# Patient Record
Sex: FEMALE | Race: WHITE | NOT HISPANIC OR LATINO | URBAN - METROPOLITAN AREA
[De-identification: names, ages, dates, MRNs, and addresses within clinical notes are randomized per-mention and may not be internally consistent; named-entity substitution may affect disease eponyms.]

---

## 2020-01-11 ENCOUNTER — INPATIENT (INPATIENT)
Facility: HOSPITAL | Age: 60
LOS: 1 days | Discharge: HOME | End: 2020-01-13
Attending: SURGERY | Admitting: SURGERY
Payer: COMMERCIAL

## 2020-01-11 VITALS
TEMPERATURE: 97 F | DIASTOLIC BLOOD PRESSURE: 82 MMHG | RESPIRATION RATE: 18 BRPM | HEIGHT: 63 IN | SYSTOLIC BLOOD PRESSURE: 153 MMHG | WEIGHT: 199.96 LBS | OXYGEN SATURATION: 100 % | HEART RATE: 88 BPM

## 2020-01-11 DIAGNOSIS — Z90.49 ACQUIRED ABSENCE OF OTHER SPECIFIED PARTS OF DIGESTIVE TRACT: Chronic | ICD-10-CM

## 2020-01-11 DIAGNOSIS — E78.5 HYPERLIPIDEMIA, UNSPECIFIED: ICD-10-CM

## 2020-01-11 DIAGNOSIS — K25.9 GASTRIC ULCER, UNSPECIFIED AS ACUTE OR CHRONIC, WITHOUT HEMORRHAGE OR PERFORATION: ICD-10-CM

## 2020-01-11 DIAGNOSIS — E03.9 HYPOTHYROIDISM, UNSPECIFIED: ICD-10-CM

## 2020-01-11 DIAGNOSIS — K81.9 CHOLECYSTITIS, UNSPECIFIED: ICD-10-CM

## 2020-01-11 LAB
ALBUMIN SERPL ELPH-MCNC: 4.7 G/DL — SIGNIFICANT CHANGE UP (ref 3.5–5.2)
ALP SERPL-CCNC: 67 U/L — SIGNIFICANT CHANGE UP (ref 30–115)
ALT FLD-CCNC: 63 U/L — HIGH (ref 0–41)
ANION GAP SERPL CALC-SCNC: 10 MMOL/L — SIGNIFICANT CHANGE UP (ref 7–14)
ANION GAP SERPL CALC-SCNC: 13 MMOL/L — SIGNIFICANT CHANGE UP (ref 7–14)
APPEARANCE UR: CLEAR — SIGNIFICANT CHANGE UP
APTT BLD: 31.7 SEC — SIGNIFICANT CHANGE UP (ref 27–39.2)
AST SERPL-CCNC: 133 U/L — HIGH (ref 0–41)
BASE EXCESS BLDV CALC-SCNC: -3.6 MMOL/L — LOW (ref -2–2)
BASOPHILS # BLD AUTO: 0.09 K/UL — SIGNIFICANT CHANGE UP (ref 0–0.2)
BASOPHILS NFR BLD AUTO: 0.7 % — SIGNIFICANT CHANGE UP (ref 0–1)
BILIRUB SERPL-MCNC: 0.7 MG/DL — SIGNIFICANT CHANGE UP (ref 0.2–1.2)
BILIRUB UR-MCNC: NEGATIVE — SIGNIFICANT CHANGE UP
BLD GP AB SCN SERPL QL: SIGNIFICANT CHANGE UP
BUN SERPL-MCNC: 22 MG/DL — HIGH (ref 10–20)
BUN SERPL-MCNC: 31 MG/DL — HIGH (ref 10–20)
CA-I SERPL-SCNC: 1.2 MMOL/L — SIGNIFICANT CHANGE UP (ref 1.12–1.3)
CALCIUM SERPL-MCNC: 8.5 MG/DL — SIGNIFICANT CHANGE UP (ref 8.5–10.1)
CALCIUM SERPL-MCNC: 9.5 MG/DL — SIGNIFICANT CHANGE UP (ref 8.5–10.1)
CHLORIDE SERPL-SCNC: 101 MMOL/L — SIGNIFICANT CHANGE UP (ref 98–110)
CHLORIDE SERPL-SCNC: 108 MMOL/L — SIGNIFICANT CHANGE UP (ref 98–110)
CO2 SERPL-SCNC: 22 MMOL/L — SIGNIFICANT CHANGE UP (ref 17–32)
CO2 SERPL-SCNC: 22 MMOL/L — SIGNIFICANT CHANGE UP (ref 17–32)
COLOR SPEC: YELLOW — SIGNIFICANT CHANGE UP
CREAT SERPL-MCNC: 0.7 MG/DL — SIGNIFICANT CHANGE UP (ref 0.7–1.5)
CREAT SERPL-MCNC: 0.8 MG/DL — SIGNIFICANT CHANGE UP (ref 0.7–1.5)
DIFF PNL FLD: NEGATIVE — SIGNIFICANT CHANGE UP
EOSINOPHIL # BLD AUTO: 0.16 K/UL — SIGNIFICANT CHANGE UP (ref 0–0.7)
EOSINOPHIL NFR BLD AUTO: 1.2 % — SIGNIFICANT CHANGE UP (ref 0–8)
GAS PNL BLDV: 140 MMOL/L — SIGNIFICANT CHANGE UP (ref 136–145)
GAS PNL BLDV: SIGNIFICANT CHANGE UP
GLUCOSE SERPL-MCNC: 102 MG/DL — HIGH (ref 70–99)
GLUCOSE SERPL-MCNC: 130 MG/DL — HIGH (ref 70–99)
GLUCOSE UR QL: NEGATIVE MG/DL — SIGNIFICANT CHANGE UP
HCO3 BLDV-SCNC: 23 MMOL/L — SIGNIFICANT CHANGE UP (ref 22–29)
HCT VFR BLD CALC: 41.2 % — SIGNIFICANT CHANGE UP (ref 37–47)
HCT VFR BLD CALC: 44.5 % — SIGNIFICANT CHANGE UP (ref 37–47)
HCT VFR BLDA CALC: 43.5 % — SIGNIFICANT CHANGE UP (ref 34–44)
HGB BLD CALC-MCNC: 14.2 G/DL — SIGNIFICANT CHANGE UP (ref 14–18)
HGB BLD-MCNC: 12.8 G/DL — SIGNIFICANT CHANGE UP (ref 12–16)
HGB BLD-MCNC: 13.8 G/DL — SIGNIFICANT CHANGE UP (ref 12–16)
HOROWITZ INDEX BLDV+IHG-RTO: 21 — SIGNIFICANT CHANGE UP
IMM GRANULOCYTES NFR BLD AUTO: 0.4 % — HIGH (ref 0.1–0.3)
INR BLD: 0.97 RATIO — SIGNIFICANT CHANGE UP (ref 0.65–1.3)
KETONES UR-MCNC: NEGATIVE — SIGNIFICANT CHANGE UP
LACTATE BLDV-MCNC: 1.1 MMOL/L — SIGNIFICANT CHANGE UP (ref 0.5–1.6)
LEUKOCYTE ESTERASE UR-ACNC: NEGATIVE — SIGNIFICANT CHANGE UP
LIDOCAIN IGE QN: 38 U/L — SIGNIFICANT CHANGE UP (ref 7–60)
LYMPHOCYTES # BLD AUTO: 49.6 % — SIGNIFICANT CHANGE UP (ref 20.5–51.1)
LYMPHOCYTES # BLD AUTO: 6.75 K/UL — HIGH (ref 1.2–3.4)
MCHC RBC-ENTMCNC: 27 PG — SIGNIFICANT CHANGE UP (ref 27–31)
MCHC RBC-ENTMCNC: 27.5 PG — SIGNIFICANT CHANGE UP (ref 27–31)
MCHC RBC-ENTMCNC: 31 G/DL — LOW (ref 32–37)
MCHC RBC-ENTMCNC: 31.1 G/DL — LOW (ref 32–37)
MCV RBC AUTO: 87.1 FL — SIGNIFICANT CHANGE UP (ref 81–99)
MCV RBC AUTO: 88.4 FL — SIGNIFICANT CHANGE UP (ref 81–99)
MONOCYTES # BLD AUTO: 0.97 K/UL — HIGH (ref 0.1–0.6)
MONOCYTES NFR BLD AUTO: 7.1 % — SIGNIFICANT CHANGE UP (ref 1.7–9.3)
NEUTROPHILS # BLD AUTO: 5.59 K/UL — SIGNIFICANT CHANGE UP (ref 1.4–6.5)
NEUTROPHILS NFR BLD AUTO: 41 % — LOW (ref 42.2–75.2)
NITRITE UR-MCNC: NEGATIVE — SIGNIFICANT CHANGE UP
NRBC # BLD: 0 /100 WBCS — SIGNIFICANT CHANGE UP (ref 0–0)
NRBC # BLD: 0 /100 WBCS — SIGNIFICANT CHANGE UP (ref 0–0)
PCO2 BLDV: 46 MMHG — SIGNIFICANT CHANGE UP (ref 41–51)
PH BLDV: 7.31 — SIGNIFICANT CHANGE UP (ref 7.26–7.43)
PH UR: 6.5 — SIGNIFICANT CHANGE UP (ref 5–8)
PLATELET # BLD AUTO: 272 K/UL — SIGNIFICANT CHANGE UP (ref 130–400)
PLATELET # BLD AUTO: 303 K/UL — SIGNIFICANT CHANGE UP (ref 130–400)
PO2 BLDV: 44 MMHG — HIGH (ref 20–40)
POTASSIUM BLDV-SCNC: 3.9 MMOL/L — SIGNIFICANT CHANGE UP (ref 3.3–5.6)
POTASSIUM SERPL-MCNC: 4.7 MMOL/L — SIGNIFICANT CHANGE UP (ref 3.5–5)
POTASSIUM SERPL-MCNC: 6.2 MMOL/L — CRITICAL HIGH (ref 3.5–5)
POTASSIUM SERPL-SCNC: 4.7 MMOL/L — SIGNIFICANT CHANGE UP (ref 3.5–5)
POTASSIUM SERPL-SCNC: 6.2 MMOL/L — CRITICAL HIGH (ref 3.5–5)
PROT SERPL-MCNC: 7.9 G/DL — SIGNIFICANT CHANGE UP (ref 6–8)
PROT UR-MCNC: NEGATIVE MG/DL — SIGNIFICANT CHANGE UP
PROTHROM AB SERPL-ACNC: 11.2 SEC — SIGNIFICANT CHANGE UP (ref 9.95–12.87)
RBC # BLD: 4.66 M/UL — SIGNIFICANT CHANGE UP (ref 4.2–5.4)
RBC # BLD: 5.11 M/UL — SIGNIFICANT CHANGE UP (ref 4.2–5.4)
RBC # FLD: 13.1 % — SIGNIFICANT CHANGE UP (ref 11.5–14.5)
RBC # FLD: 13.2 % — SIGNIFICANT CHANGE UP (ref 11.5–14.5)
SAO2 % BLDV: 74 % — SIGNIFICANT CHANGE UP
SODIUM SERPL-SCNC: 136 MMOL/L — SIGNIFICANT CHANGE UP (ref 135–146)
SODIUM SERPL-SCNC: 140 MMOL/L — SIGNIFICANT CHANGE UP (ref 135–146)
SP GR SPEC: 1.01 — SIGNIFICANT CHANGE UP (ref 1.01–1.03)
UROBILINOGEN FLD QL: 0.2 MG/DL — SIGNIFICANT CHANGE UP (ref 0.2–0.2)
WBC # BLD: 13.62 K/UL — HIGH (ref 4.8–10.8)
WBC # BLD: 6.63 K/UL — SIGNIFICANT CHANGE UP (ref 4.8–10.8)
WBC # FLD AUTO: 13.62 K/UL — HIGH (ref 4.8–10.8)
WBC # FLD AUTO: 6.63 K/UL — SIGNIFICANT CHANGE UP (ref 4.8–10.8)

## 2020-01-11 PROCEDURE — 99221 1ST HOSP IP/OBS SF/LOW 40: CPT | Mod: AI

## 2020-01-11 PROCEDURE — 74177 CT ABD & PELVIS W/CONTRAST: CPT | Mod: 26

## 2020-01-11 PROCEDURE — 71045 X-RAY EXAM CHEST 1 VIEW: CPT | Mod: 26

## 2020-01-11 PROCEDURE — 74181 MRI ABDOMEN W/O CONTRAST: CPT | Mod: 26

## 2020-01-11 PROCEDURE — 99285 EMERGENCY DEPT VISIT HI MDM: CPT

## 2020-01-11 PROCEDURE — 76705 ECHO EXAM OF ABDOMEN: CPT | Mod: 26

## 2020-01-11 RX ORDER — IBUPROFEN 200 MG
600 TABLET ORAL EVERY 6 HOURS
Refills: 0 | Status: DISCONTINUED | OUTPATIENT
Start: 2020-01-11 | End: 2020-01-13

## 2020-01-11 RX ORDER — SIMVASTATIN 20 MG/1
40 TABLET, FILM COATED ORAL AT BEDTIME
Refills: 0 | Status: DISCONTINUED | OUTPATIENT
Start: 2020-01-11 | End: 2020-01-13

## 2020-01-11 RX ORDER — LEVOTHYROXINE SODIUM 125 MCG
75 TABLET ORAL DAILY
Refills: 0 | Status: DISCONTINUED | OUTPATIENT
Start: 2020-01-11 | End: 2020-01-11

## 2020-01-11 RX ORDER — HEPARIN SODIUM 5000 [USP'U]/ML
5000 INJECTION INTRAVENOUS; SUBCUTANEOUS EVERY 8 HOURS
Refills: 0 | Status: DISCONTINUED | OUTPATIENT
Start: 2020-01-11 | End: 2020-01-11

## 2020-01-11 RX ORDER — LEVOTHYROXINE SODIUM 125 MCG
75 TABLET ORAL ONCE
Refills: 0 | Status: DISCONTINUED | OUTPATIENT
Start: 2020-01-11 | End: 2020-01-13

## 2020-01-11 RX ORDER — CHLORHEXIDINE GLUCONATE 213 G/1000ML
1 SOLUTION TOPICAL
Refills: 0 | Status: DISCONTINUED | OUTPATIENT
Start: 2020-01-11 | End: 2020-01-13

## 2020-01-11 RX ORDER — ONDANSETRON 8 MG/1
4 TABLET, FILM COATED ORAL ONCE
Refills: 0 | Status: COMPLETED | OUTPATIENT
Start: 2020-01-11 | End: 2020-01-11

## 2020-01-11 RX ORDER — HEPARIN SODIUM 5000 [USP'U]/ML
5000 INJECTION INTRAVENOUS; SUBCUTANEOUS EVERY 8 HOURS
Refills: 0 | Status: DISCONTINUED | OUTPATIENT
Start: 2020-01-11 | End: 2020-01-13

## 2020-01-11 RX ORDER — LEVOTHYROXINE SODIUM 125 MCG
1 TABLET ORAL
Qty: 0 | Refills: 0 | DISCHARGE
Start: 2020-01-11

## 2020-01-11 RX ORDER — LEVOTHYROXINE SODIUM 125 MCG
88 TABLET ORAL DAILY
Refills: 0 | Status: DISCONTINUED | OUTPATIENT
Start: 2020-01-11 | End: 2020-01-13

## 2020-01-11 RX ORDER — MORPHINE SULFATE 50 MG/1
4 CAPSULE, EXTENDED RELEASE ORAL ONCE
Refills: 0 | Status: DISCONTINUED | OUTPATIENT
Start: 2020-01-11 | End: 2020-01-11

## 2020-01-11 RX ORDER — ACETAMINOPHEN 500 MG
650 TABLET ORAL EVERY 4 HOURS
Refills: 0 | Status: DISCONTINUED | OUTPATIENT
Start: 2020-01-11 | End: 2020-01-13

## 2020-01-11 RX ORDER — SODIUM CHLORIDE 9 MG/ML
1000 INJECTION, SOLUTION INTRAVENOUS
Refills: 0 | Status: DISCONTINUED | OUTPATIENT
Start: 2020-01-11 | End: 2020-01-13

## 2020-01-11 RX ORDER — AMPICILLIN SODIUM AND SULBACTAM SODIUM 250; 125 MG/ML; MG/ML
1.5 INJECTION, POWDER, FOR SUSPENSION INTRAMUSCULAR; INTRAVENOUS EVERY 6 HOURS
Refills: 0 | Status: DISCONTINUED | OUTPATIENT
Start: 2020-01-11 | End: 2020-01-11

## 2020-01-11 RX ORDER — DIPHENHYDRAMINE HYDROCHLORIDE AND LIDOCAINE HYDROCHLORIDE AND ALUMINUM HYDROXIDE AND MAGNESIUM HYDRO
30 KIT ONCE
Refills: 0 | Status: COMPLETED | OUTPATIENT
Start: 2020-01-11 | End: 2020-01-11

## 2020-01-11 RX ORDER — SIMVASTATIN 20 MG/1
1 TABLET, FILM COATED ORAL
Qty: 0 | Refills: 0 | DISCHARGE

## 2020-01-11 RX ORDER — PANTOPRAZOLE SODIUM 20 MG/1
40 TABLET, DELAYED RELEASE ORAL
Refills: 0 | Status: DISCONTINUED | OUTPATIENT
Start: 2020-01-11 | End: 2020-01-13

## 2020-01-11 RX ORDER — OXYCODONE AND ACETAMINOPHEN 5; 325 MG/1; MG/1
2 TABLET ORAL EVERY 6 HOURS
Refills: 0 | Status: DISCONTINUED | OUTPATIENT
Start: 2020-01-11 | End: 2020-01-13

## 2020-01-11 RX ORDER — FAMOTIDINE 10 MG/ML
20 INJECTION INTRAVENOUS ONCE
Refills: 0 | Status: COMPLETED | OUTPATIENT
Start: 2020-01-11 | End: 2020-01-11

## 2020-01-11 RX ORDER — AMPICILLIN SODIUM AND SULBACTAM SODIUM 250; 125 MG/ML; MG/ML
1.5 INJECTION, POWDER, FOR SUSPENSION INTRAMUSCULAR; INTRAVENOUS EVERY 6 HOURS
Refills: 0 | Status: DISCONTINUED | OUTPATIENT
Start: 2020-01-11 | End: 2020-01-13

## 2020-01-11 RX ORDER — AMPICILLIN SODIUM AND SULBACTAM SODIUM 250; 125 MG/ML; MG/ML
3 INJECTION, POWDER, FOR SUSPENSION INTRAMUSCULAR; INTRAVENOUS ONCE
Refills: 0 | Status: COMPLETED | OUTPATIENT
Start: 2020-01-11 | End: 2020-01-11

## 2020-01-11 RX ORDER — SODIUM CHLORIDE 9 MG/ML
1000 INJECTION INTRAMUSCULAR; INTRAVENOUS; SUBCUTANEOUS ONCE
Refills: 0 | Status: COMPLETED | OUTPATIENT
Start: 2020-01-11 | End: 2020-01-11

## 2020-01-11 RX ADMIN — SIMVASTATIN 40 MILLIGRAM(S): 20 TABLET, FILM COATED ORAL at 21:39

## 2020-01-11 RX ADMIN — AMPICILLIN SODIUM AND SULBACTAM SODIUM 100 GRAM(S): 250; 125 INJECTION, POWDER, FOR SUSPENSION INTRAMUSCULAR; INTRAVENOUS at 16:05

## 2020-01-11 RX ADMIN — FAMOTIDINE 100 MILLIGRAM(S): 10 INJECTION INTRAVENOUS at 03:43

## 2020-01-11 RX ADMIN — SODIUM CHLORIDE 75 MILLILITER(S): 9 INJECTION, SOLUTION INTRAVENOUS at 21:31

## 2020-01-11 RX ADMIN — Medication 20 MILLIGRAM(S): at 04:56

## 2020-01-11 RX ADMIN — SODIUM CHLORIDE 1000 MILLILITER(S): 9 INJECTION INTRAMUSCULAR; INTRAVENOUS; SUBCUTANEOUS at 03:44

## 2020-01-11 RX ADMIN — MORPHINE SULFATE 4 MILLIGRAM(S): 50 CAPSULE, EXTENDED RELEASE ORAL at 02:20

## 2020-01-11 RX ADMIN — Medication 88 MICROGRAM(S): at 16:03

## 2020-01-11 RX ADMIN — SODIUM CHLORIDE 75 MILLILITER(S): 9 INJECTION, SOLUTION INTRAVENOUS at 12:32

## 2020-01-11 RX ADMIN — AMPICILLIN SODIUM AND SULBACTAM SODIUM 200 GRAM(S): 250; 125 INJECTION, POWDER, FOR SUSPENSION INTRAMUSCULAR; INTRAVENOUS at 09:52

## 2020-01-11 RX ADMIN — MORPHINE SULFATE 4 MILLIGRAM(S): 50 CAPSULE, EXTENDED RELEASE ORAL at 03:34

## 2020-01-11 RX ADMIN — DIPHENHYDRAMINE HYDROCHLORIDE AND LIDOCAINE HYDROCHLORIDE AND ALUMINUM HYDROXIDE AND MAGNESIUM HYDRO 30 MILLILITER(S): KIT at 03:44

## 2020-01-11 RX ADMIN — HEPARIN SODIUM 5000 UNIT(S): 5000 INJECTION INTRAVENOUS; SUBCUTANEOUS at 21:40

## 2020-01-11 RX ADMIN — MORPHINE SULFATE 4 MILLIGRAM(S): 50 CAPSULE, EXTENDED RELEASE ORAL at 03:40

## 2020-01-11 RX ADMIN — AMPICILLIN SODIUM AND SULBACTAM SODIUM 100 GRAM(S): 250; 125 INJECTION, POWDER, FOR SUSPENSION INTRAMUSCULAR; INTRAVENOUS at 21:40

## 2020-01-11 RX ADMIN — SODIUM CHLORIDE 1000 MILLILITER(S): 9 INJECTION INTRAMUSCULAR; INTRAVENOUS; SUBCUTANEOUS at 02:20

## 2020-01-11 RX ADMIN — HEPARIN SODIUM 5000 UNIT(S): 5000 INJECTION INTRAVENOUS; SUBCUTANEOUS at 14:49

## 2020-01-11 RX ADMIN — ONDANSETRON 4 MILLIGRAM(S): 8 TABLET, FILM COATED ORAL at 02:20

## 2020-01-11 NOTE — H&P ADULT - ASSESSMENT
59 year old female with PMH of hypothyroidism, PUD, HLD presents c/o epigastric and RUQ pain since 1 am. Abdominal US showed GB wall thickening and edema without sonographic findings of acute cholecystitis; common bile duct 0.92 cm. Pt has mildly elevated LFTs, leukocytosis,  VS are WNL.     Plan:  - admit to surgery

## 2020-01-11 NOTE — CHART NOTE - NSCHARTNOTEFT_GEN_A_CORE
As per Dr. Mills, will order MRCP to r/o CBD obstruction.   Spoke to MRI tech, patient will have the test done either tonight or tomorrow AM.

## 2020-01-11 NOTE — H&P ADULT - NSICDXFAMILYHX_GEN_ALL_CORE_FT
FAMILY HISTORY:  Family history of gastric cancer, father  Family history of pulmonary embolism, mother

## 2020-01-11 NOTE — H&P ADULT - NSHPLABSRESULTS_GEN_ALL_CORE
LABS:                        13.8   13.62 )-----------( 303      ( 2020 02:20 )             44.5         136  |  101  |  31<H>  ----------------------------<  130<H>  6.2<HH>   |  22  |  0.8    Ca    9.5      2020 02:20    TPro  7.9  /  Alb  4.7  /  TBili  0.7  /  DBili  x   /  AST  133<H>  /  ALT  63<H>  /  AlkPhos  67        Urinalysis Basic - ( 2020 05:45 )    Color: Yellow / Appearance: Clear / S.010 / pH: x  Gluc: x / Ketone: Negative  / Bili: Negative / Urobili: 0.2 mg/dL   Blood: x / Protein: Negative mg/dL / Nitrite: Negative   Leuk Esterase: Negative / RBC: x / WBC x   Sq Epi: x / Non Sq Epi: x / Bacteria: x        CT Abdomen and Pelvis w/ IV Cont (20 @ 03:35)     IMPRESSION:      Distended gallbladder with questionable wall edema. Further evaluation with right upper quadrant ultrasound may be of benefit.        ***Please see the addendum at the top of this report. It may contain additional important information or changes.****      BLAKE GARCIA M.D., RESIDENT RADIOLOGIST  This document has been electronically signed.  NAREN MORRISON M.D., ATTENDING RADIOLOGIST  This document has been electronically signed. 2020  5:36AM  Addend:  NAREN MORRISON M.D., ATTENDING RADIOLOGIST  This addendum was electronically signed on: 2020  5:49AM.      US Abdomen Limited (20 @ 08:17)     EXAM:  US ABDOMEN LIMITED            PROCEDURE DATE:  2020            INTERPRETATION:  CLINICAL HISTORY: Pain.    Correlation: CT abdomen and pelvis 2020 time 3:20 AM.    PROCEDURE: Ultrasound of the right upper quadrant was performed.    FINDINGS:    LIVER:  Normal in contour and echogenicity measuring 16.3 cm in length x 14.1 cm AP with no focal mass or dilatation of the intrahepatic bile ducts..    GALLBLADDER/BILIARY TREE: Gallbladder wall thickening and edema. Dilated common bile duct 0.92 cm. No stones. Negative sonographic Mathew's sign.    PANCREAS: Obscured by overlying bowel gas.    KIDNEY:  Right kidney measures 10.2 cm in length x 4.9 cm in width x 4.7 cm AP with no hydronephrosis, calculi, perinephric fluid orsolid mass. Right renal vascular flow    AORTA/IVC:  Visualized IVC proximal portions unremarkable. Proximal abdominal aorta, obscured..    ASCITES:  No right upper quadrant ascites or right pleural effusion.    IMPRESSION:    1.  Gallbladder wall thickening and edema with no sonographic findings to suggest the presence of acute cholecystitis.    2.  Common bile duct dilatation.          AMELIA DURHAM M.D., ATTENDING RADIOLOGIST  This document has been electronically signed. 2020 9:29AM

## 2020-01-11 NOTE — ED PROVIDER NOTE - PROGRESS NOTE DETAILS
patient initially didn't improve with morphine and finally improved after GI meds and Bentyl. re-exam abdomen, round soft and non-tender now. CT changed read to ? GB edema and distended GB. attempted bedside sono but it was limited exam 2/2 body habitus. will order US abdomen. Patient still c/o pain, does not want more pain meds at this time. Awaiting foster Brenda Monge aware and will come see patient

## 2020-01-11 NOTE — ED PROVIDER NOTE - OBJECTIVE STATEMENT
60 yo female no sig hx present c/o upper abdominal pain for the past 3 hours. pain was sharp and assoc with nausea. denies similar pain in the past. last BM was 10-11 pm tonight. had dinner around 7 pm without pain. Denies fever/chill/vomiting/diarrhea/change of appetite/constipation and urinary sxs. Denies HA/dizziness/chest pain/sob/palpitations and pain to extremities and ambulatory difficulty.

## 2020-01-11 NOTE — ED PROVIDER NOTE - CLINICAL SUMMARY MEDICAL DECISION MAKING FREE TEXT BOX
pt co ruq pain, sudden, non rad. sharp. 2 hours, constant. n, v. nbnb. no fever/chills no cp or sob. hx of appendectomy.   pt in mild dist. anict, cta, s1s2, ab soft, tender ruq. no rebound. no guarding. no cvat.  labs, imaging, pain meds. pt co ruq pain, sudden, non rad. sharp. 2 hours, constant. n, v. nbnb. no fever/chills no cp or sob. hx of appendectomy.   pt in mild dist. anict, cta, s1s2, ab soft, tender ruq. no rebound. no guarding. no cvat.  labs, imaging, pain meds.  patient continued to have pain we consulted surgery who advises admission, in addition repeat bmp, unasyn.

## 2020-01-11 NOTE — H&P ADULT - NSHPPHYSICALEXAM_GEN_ALL_CORE
Vital Signs Last 24 Hrs  T(C): 36.4 (11 Jan 2020 08:34), Max: 36.4 (11 Jan 2020 08:34)  T(F): 97.6 (11 Jan 2020 08:34), Max: 97.6 (11 Jan 2020 08:34)  HR: 69 (11 Jan 2020 08:34) (65 - 88)  BP: 133/64 (11 Jan 2020 08:34) (119/64 - 153/82)  RR: 17 (11 Jan 2020 08:34) (17 - 18)  SpO2: 97% (11 Jan 2020 08:34) (97% - 100%)    Physical Exam:  General:  WD, WN, conversant in NAD.  HEENT:  NC/AT  Pulmonary:  CTA B/L, normal resp effort.  Cardiovascular:  S1/ S2, NSR, no murmurs, rubs or gallops.  Abdominal: distended, +RUQ tenderness, (+) Mathew's sign  Extremities: No LE edema   Neuro:  Awake, alert & oriented x 3, speech clear
10

## 2020-01-11 NOTE — CONSULT NOTE ADULT - SUBJECTIVE AND OBJECTIVE BOX
59 year old female with PMH of hypothyroidism, PUD, HLD presents c/o epigastric and RUQ pain since 1 am. Pt states pain woke her up from sleep. She describes it as stabbing, non radiating, 10/10, associated with nausea, retching, and abdominal distention. Pt denies similar episodes in the past. Pt having flatus. Last BM yesterday night.   Pt states her last EGD and colonoscopy were 2 years ago, she was dx with PUD likely 2/2 H.pylori  at that time.    In ED pt was given pain meds. She admits to abd pain 7/10, denies N,V.           PAST MEDICAL & SURGICAL HISTORY:  HLD (hyperlipidemia)  hypothyroidism  PUD  History of appendectomy        MEDICATIONS  (STANDING):    MEDICATIONS  (PRN):        Allergies    No Known Allergies              SOCIAL HISTORY:  Tobacco use: vaping, former smoker x 40 y,   Alcohol use: social  Drug use: denies      FAMILY HISTORY:    Mother: PA  Father: gastric CA      I&O's Summary      Vital Signs Last 24 Hrs  T(C): 36.4 (2020 08:34), Max: 36.4 (2020 08:34)  T(F): 97.6 (2020 08:34), Max: 97.6 (2020 08:34)  HR: 69 (2020 08:34) (65 - 88)  BP: 133/64 (2020 08:34) (119/64 - 153/82)  RR: 17 (2020 08:34) (17 - 18)  SpO2: 97% (2020 08:34) (97% - 100%)    Physical Exam:  General:  WD, WN, conversant in NAD.  HEENT:  NC/AT  Pulmonary:  CTA B/L, normal resp effort.  Cardiovascular:  S1/ S2, NSR, no murmurs, rubs or gallops.  Abdominal:  soft, distended, RUG tenderness, (+) Mathew's sign  Extremities: No LE edema   Neuro:  Awake, alert & oriented x 3, speech clear          LABS:                        13.8   13.62 )-----------( 303      ( 2020 02:20 )             44.5     01-11    136  |  101  |  31<H>  ----------------------------<  130<H>  6.2<HH>   |  22  |  0.8    Ca    9.5      2020 02:20    TPro  7.9  /  Alb  4.7  /  TBili  0.7  /  DBili  x   /  AST  133<H>  /  ALT  63<H>  /  AlkPhos  67        Urinalysis Basic - ( 2020 05:45 )    Color: Yellow / Appearance: Clear / S.010 / pH: x  Gluc: x / Ketone: Negative  / Bili: Negative / Urobili: 0.2 mg/dL   Blood: x / Protein: Negative mg/dL / Nitrite: Negative   Leuk Esterase: Negative / RBC: x / WBC x   Sq Epi: x / Non Sq Epi: x / Bacteria: x      CAPILLARY BLOOD GLUCOSE        LIVER FUNCTIONS - ( 2020 02:20 )  Alb: 4.7 g/dL / Pro: 7.9 g/dL / ALK PHOS: 67 U/L / ALT: 63 U/L / AST: 133 U/L / GGT: x             Cultures:          RADIOLOGY & ADDITIONAL STUDIES:    US Abdomen pending    < from: CT Abdomen and Pelvis w/ IV Cont (20 @ 03:35) >  EXAM:  CT ABDOMEN AND PELVIS IC          *** ADDENDUM 2020  ***    The recommendations were discussed with GREGG Lopez on 2020 at 5:45 AM      *** END OF ADDENDUM 2020  ***        PROCEDURE DATE:  2020            INTERPRETATION:  CLINICAL HISTORY / REASON FOR EXAM: Upper abdominal pain x3 hours.    TECHNIQUE: Contiguous axial CT images were obtained from the lower chest to the pubic symphysis following administration of 93 mL Optiray 320 intravenous contrast, 7 mL discarded. Oral contrast was not administered. Reformatted images in the coronal and sagittal planes were acquired.    COMPARISON CT: CT abdomen and pelvis from 2016    FINDINGS:    LOWER CHEST: There is mild bibasilar subsegmental atelectasis.    HEPATOBILIARY: The liver is normal in appearance. No evidence of intra or extrahepatic biliary dilatation. Distended gallbladder with questionable wall edema.     SPLEEN: Unremarkable.    PANCREAS: Unremarkable.    ADRENAL GLANDS: Unremarkable.    KIDNEYS: Symmetric pattern of renal enhancement. No evidence of a mass, hydronephrosis or hydroureter.    ABDOMINOPELVIC NODES: No enlarged abdominal or pelvic lymph nodes.    PELVIC ORGANS: Unremarkable.    PERITONEUM/MESENTERY/BOWEL: No bowel obstruction, ascites or intraperitoneal free air.    BONES/SOFT TISSUES: Diffuse osteopenia. Degenerative changes of thoracolumbar spine. Small fat-containing umbilical hernia.    VASCULAR: The aorta is normal in caliber.      IMPRESSION:      Distended gallbladder with questionable wall edema. Further evaluation with right upper quadrant ultrasound may be of benefit.        ***Please see the addendum at the top of this report. It may contain additional important information or changes.****      BLAKE GARCIA M.D., RESIDENT RADIOLOGIST  This document has been electronically signed.  NAREN MORRISON M.D., ATTENDING RADIOLOGIST  This document has been electronically signed. 2020  5:36AM  Addend:  NAREN MORRISON M.D., ATTENDING RADIOLOGIST  This addendum was electronically signed on: 2020  5:49AM.

## 2020-01-11 NOTE — ED ADULT NURSE NOTE - CAS DISCH ACCOMP BY
How Severe Are Your Spot(S)?: mild What Is The Reason For Today's Visit?: Full Body Skin Examination What Is The Reason For Today's Visit? (Being Monitored For X): concerning skin lesions on an annual basis Transport

## 2020-01-11 NOTE — ED PROVIDER NOTE - PHYSICAL EXAMINATION
CONSTITUTIONAL: Well-appearing; well-nourished; in no apparent distress.   EYES: PERRL; EOM intact.   ENT: normal nose; no rhinorrhea; normal pharynx with no tonsillar hypertrophy.   NECK: Supple; non-tender; no cervical lymphadenopathy. No JVD.   CARDIOVASCULAR: Normal S1, S2; no murmurs, rubs, or gallops.   RESPIRATORY: Normal chest excursion with respiration; breath sounds clear and equal bilaterally; no wheezes, rhonchi, or rales.  GI/: + RUQ/Epigastric tenderness. no rebound and guarding. soft and round abdomen. no CVAT.   MS: No midline tenderness to neck/back.   SKIN: Normal for age and race; warm; dry; good turgor; no apparent lesions or exudate.   NEURO/PSYCH: A & O x 4; grossly unremarkable.

## 2020-01-11 NOTE — H&P ADULT - HISTORY OF PRESENT ILLNESS
59 year old female with PMH of hypothyroidism, PUD, HLD presents c/o epigastric and RUQ pain since 1 am. Pt states pain woke her up from sleep. She describes it as stabbing, non radiating, 10/10, associated with nausea, retching, and abdominal distention. Pt denies similar episodes in the past. Pt having flatus. Last BM yesterday night.   Pt states her last EGD and colonoscopy were 2 years ago, she was dx with PUD likely 2/2 H.pylori  at that time.    In ED pt was given pain meds. She admits to abd pain 7/10, denies N/V/D/C, diaphoresis, CP, SOB, HA.

## 2020-01-11 NOTE — H&P ADULT - PROBLEM SELECTOR PLAN 1
- Admit pt to surgery  - NPO except meds  - IV Unasyn 3 g loading dose in ED, then 1.5 g q6h  - CXR  - IVF LR @75  - DVT ppx: SQ Heparin  - GI ppx: hx of PUD- Protonix  - order coags/ T&S in case pt needs operative intervention  - f/u repeat BMP, CBC, LFTs  - pain control  - Case d/w Dr. Mills, and states if pt improves, no acute surgical intervention will be needed. Pt will be d/c with outpatient follow up. - Admit pt to surgery  - NPO except meds  - IV Unasyn 3 g loading dose in ED, then 1.5 g q6h  - CXR  - IVF LR @75  - DVT ppx: SQ Heparin  - GI ppx: hx of PUD- Protonix  - order coags/ T&S in case pt needs operative intervention  - f/u repeat BMP, CBC, LFTs  - pain control  - Case d/w Dr. Mills, and states if pt improves, no acute surgical intervention will be needed. Pt will be d/c with outpatient follow up. VS If pt does not improve possible lap craig 1/12/20.

## 2020-01-12 DIAGNOSIS — E03.9 HYPOTHYROIDISM, UNSPECIFIED: ICD-10-CM

## 2020-01-12 DIAGNOSIS — K25.9 GASTRIC ULCER, UNSPECIFIED AS ACUTE OR CHRONIC, WITHOUT HEMORRHAGE OR PERFORATION: ICD-10-CM

## 2020-01-12 LAB
ALBUMIN SERPL ELPH-MCNC: 3.7 G/DL — SIGNIFICANT CHANGE UP (ref 3.5–5.2)
ALP SERPL-CCNC: 76 U/L — SIGNIFICANT CHANGE UP (ref 30–115)
ALT FLD-CCNC: 157 U/L — HIGH (ref 0–41)
ANION GAP SERPL CALC-SCNC: 9 MMOL/L — SIGNIFICANT CHANGE UP (ref 7–14)
AST SERPL-CCNC: 112 U/L — HIGH (ref 0–41)
BILIRUB DIRECT SERPL-MCNC: 0.2 MG/DL — SIGNIFICANT CHANGE UP (ref 0–0.2)
BILIRUB INDIRECT FLD-MCNC: 0.8 MG/DL — SIGNIFICANT CHANGE UP (ref 0.2–1.2)
BILIRUB SERPL-MCNC: 1 MG/DL — SIGNIFICANT CHANGE UP (ref 0.2–1.2)
BUN SERPL-MCNC: 14 MG/DL — SIGNIFICANT CHANGE UP (ref 10–20)
CALCIUM SERPL-MCNC: 8.4 MG/DL — LOW (ref 8.5–10.1)
CHLORIDE SERPL-SCNC: 107 MMOL/L — SIGNIFICANT CHANGE UP (ref 98–110)
CO2 SERPL-SCNC: 22 MMOL/L — SIGNIFICANT CHANGE UP (ref 17–32)
CREAT SERPL-MCNC: 0.7 MG/DL — SIGNIFICANT CHANGE UP (ref 0.7–1.5)
GLUCOSE SERPL-MCNC: 90 MG/DL — SIGNIFICANT CHANGE UP (ref 70–99)
HCT VFR BLD CALC: 37.6 % — SIGNIFICANT CHANGE UP (ref 37–47)
HCV AB S/CO SERPL IA: 0.46 S/CO — SIGNIFICANT CHANGE UP (ref 0–0.99)
HCV AB SERPL-IMP: SIGNIFICANT CHANGE UP
HGB BLD-MCNC: 11.9 G/DL — LOW (ref 12–16)
MCHC RBC-ENTMCNC: 27.8 PG — SIGNIFICANT CHANGE UP (ref 27–31)
MCHC RBC-ENTMCNC: 31.6 G/DL — LOW (ref 32–37)
MCV RBC AUTO: 87.9 FL — SIGNIFICANT CHANGE UP (ref 81–99)
NRBC # BLD: 0 /100 WBCS — SIGNIFICANT CHANGE UP (ref 0–0)
PLATELET # BLD AUTO: 241 K/UL — SIGNIFICANT CHANGE UP (ref 130–400)
POTASSIUM SERPL-MCNC: 3.9 MMOL/L — SIGNIFICANT CHANGE UP (ref 3.5–5)
POTASSIUM SERPL-SCNC: 3.9 MMOL/L — SIGNIFICANT CHANGE UP (ref 3.5–5)
PROT SERPL-MCNC: 5.7 G/DL — LOW (ref 6–8)
RBC # BLD: 4.28 M/UL — SIGNIFICANT CHANGE UP (ref 4.2–5.4)
RBC # FLD: 13.5 % — SIGNIFICANT CHANGE UP (ref 11.5–14.5)
SODIUM SERPL-SCNC: 138 MMOL/L — SIGNIFICANT CHANGE UP (ref 135–146)
WBC # BLD: 5.95 K/UL — SIGNIFICANT CHANGE UP (ref 4.8–10.8)
WBC # FLD AUTO: 5.95 K/UL — SIGNIFICANT CHANGE UP (ref 4.8–10.8)

## 2020-01-12 PROCEDURE — 99222 1ST HOSP IP/OBS MODERATE 55: CPT

## 2020-01-12 RX ORDER — LEVOTHYROXINE SODIUM 125 MCG
1 TABLET ORAL
Qty: 0 | Refills: 0 | DISCHARGE
Start: 2020-01-12

## 2020-01-12 RX ADMIN — HEPARIN SODIUM 5000 UNIT(S): 5000 INJECTION INTRAVENOUS; SUBCUTANEOUS at 15:19

## 2020-01-12 RX ADMIN — SODIUM CHLORIDE 75 MILLILITER(S): 9 INJECTION, SOLUTION INTRAVENOUS at 11:45

## 2020-01-12 RX ADMIN — Medication 600 MILLIGRAM(S): at 18:30

## 2020-01-12 RX ADMIN — PANTOPRAZOLE SODIUM 40 MILLIGRAM(S): 20 TABLET, DELAYED RELEASE ORAL at 05:11

## 2020-01-12 RX ADMIN — SIMVASTATIN 40 MILLIGRAM(S): 20 TABLET, FILM COATED ORAL at 21:08

## 2020-01-12 RX ADMIN — Medication 600 MILLIGRAM(S): at 11:44

## 2020-01-12 RX ADMIN — AMPICILLIN SODIUM AND SULBACTAM SODIUM 100 GRAM(S): 250; 125 INJECTION, POWDER, FOR SUSPENSION INTRAMUSCULAR; INTRAVENOUS at 06:31

## 2020-01-12 RX ADMIN — AMPICILLIN SODIUM AND SULBACTAM SODIUM 100 GRAM(S): 250; 125 INJECTION, POWDER, FOR SUSPENSION INTRAMUSCULAR; INTRAVENOUS at 17:22

## 2020-01-12 RX ADMIN — Medication 600 MILLIGRAM(S): at 12:15

## 2020-01-12 RX ADMIN — Medication 600 MILLIGRAM(S): at 17:57

## 2020-01-12 RX ADMIN — Medication 88 MICROGRAM(S): at 05:10

## 2020-01-12 RX ADMIN — AMPICILLIN SODIUM AND SULBACTAM SODIUM 100 GRAM(S): 250; 125 INJECTION, POWDER, FOR SUSPENSION INTRAMUSCULAR; INTRAVENOUS at 12:34

## 2020-01-12 NOTE — PROGRESS NOTE ADULT - SUBJECTIVE AND OBJECTIVE BOX
.  Patient seen & examined.  No acute events noted overnight.  Remains afebrile.    Patient reports RUQ abdominal pain has improved since admission without pain medications needed.    Patient reports pain is mild currently with palpation.    Patient currently NPO.  + Flatus, + BM.  Patient denies subjective fever, chills, tremors, N/V/D, CP or SOB.         I&O's Detail    2020 07:01  -  2020 07:00  --------------------------------------------------------  IN:    lactated ringers.: 150 mL  Total IN: 150 mL    OUT:  Total OUT: 0 mL    Total NET: 150 mL            MEDICATIONS  (STANDING):  ampicillin/sulbactam  IVPB 1.5 Gram(s) IV Intermittent every 6 hours  chlorhexidine 4% Liquid 1 Application(s) Topical <User Schedule>  heparin  Injectable 5000 Unit(s) SubCutaneous every 8 hours  lactated ringers. 1000 milliLiter(s) (75 mL/Hr) IV Continuous <Continuous>  levothyroxine 88 MICROGram(s) Oral daily  levothyroxine 75 MICROGram(s) Oral once  pantoprazole    Tablet 40 milliGRAM(s) Oral before breakfast  simvastatin 40 milliGRAM(s) Oral at bedtime    MEDICATIONS  (PRN):  acetaminophen   Tablet .. 650 milliGRAM(s) Oral every 4 hours PRN Mild Pain (1 - 3)  ibuprofen  Tablet. 600 milliGRAM(s) Oral every 6 hours PRN Moderate Pain (4 - 6)  oxycodone    5 mG/acetaminophen 325 mG 2 Tablet(s) Oral every 6 hours PRN Severe Pain (7 - 10)          Vital Signs Last 24 Hrs  T(C): 36.3 (2020 05:00), Max: 36.3 (2020 05:00)  T(F): 97.4 (2020 05:00), Max: 97.4 (2020 05:00)  HR: 70 (2020 05:00) (52 - 70)  BP: 114/58 (2020 05:00) (98/58 - 114/58)  RR: 18 (2020 05:00) (16 - 18)  SpO2: 97% (2020 10:28) (97% - 97%)          Physical Exam:  General:  WD, WN, conversant in NAD.   Neck:  Supple, No JVD.  Chest:  Clear to auscultation bilaterally, Equal expansion bilaterally, equal breath sounds, No W/R/R.  CV:  S1 & S2, RRR, No M/R/G.   Abdomen:  + Bowel sounds, soft, no distention.  Mild RUQ tenderness,  No Mathew's sign,  No rebound/guarding or peritoneal signs.    Extremities:  No C/C/E,  No calf tenderness B/L.            LABS:                        11.9   5.95  )-----------( 241      ( 2020 05:00 )             37.6     -12    138  |  107  |  14  ----------------------------<  90  3.9   |  22  |  0.7    Ca    8.4<L>      2020 05:00    TPro  5.7<L>  /  Alb  3.7  /  TBili  1.0  /  DBili  0.2  /  AST  112<H>  /  ALT  157<H>  /  AlkPhos  76  01-12      PT/INR - ( 2020 10:51 )   PT: 11.20 sec;   INR: 0.97 ratio    PTT - ( 2020 10:51 )  PTT:31.7 sec      Urinalysis Basic - ( 2020 05:45 )    Color: Yellow / Appearance: Clear / S.010 / pH: x  Gluc: x / Ketone: Negative  / Bili: Negative / Urobili: 0.2 mg/dL   Blood: x / Protein: Negative mg/dL / Nitrite: Negative   Leuk Esterase: Negative / RBC: x / WBC x   Sq Epi: x / Non Sq Epi: x / Bacteria: x            RADIOLOGY & ADDITIONAL STUDIES:    MR Abdomen No Cont (20 @ 18:47)   EXAM:  MR ABDOMEN          PROCEDURE DATE:  2020      INTERPRETATION:  Clinical History / Reason for exam: Common bile duct dilatation. Evaluate for CBD stone.    An MRCP and MRI of the abdomen was performed consisting of axial in andout of phase gradient-echo, fasting echo T2, fat saturated fast spin echo T2 as well as single shot T2-weighted images in the axial and coronal images were performed.    Subsequently heavy T2-weighted images of the right upper quadrant were obtained and data was reconstructed into multiplanar images for an MRCP.    COMPARISON: Abdominal sonogram and CT abdomen pelvis 2020    FINDINGS:    The liver, spleen, pancreas and adrenal glands are unremarkable. The CBD is normal caliber measuring 5 mm without choledocholithiasis.    There is no intrahepatic biliary duct dilatation. The gallbladder is mildly distended, with no significant pericholecystic inflammation.    The kidneys demonstrate no hydroureteronephrosis.    There is no distended bowel loop.    The visualized lung bases are unremarkable.    There is no acute osseous abnormality.    No retroperitoneal lymphadenopathy.    IMPRESSION:    Normal CBD caliber, with no choledocholithiasis.

## 2020-01-12 NOTE — PROGRESS NOTE ADULT - ATTENDING COMMENTS
Pt seen and examined @ bedside with surgical resident, 0900.  Chart/labs/imaging reviewed.  She feels much better overall, still anorexic, but w/ only minimal RUQ pain @ rest, no nausea/emesis.  She feels bloated.  Has not used analgesics since admission.  Stable, afebrile, anicteric.  Abd is obese but soft,  GB not palpable, minimally (+) Mathew's sign, no other abd. or CVA tenderness.  Repeat labs noted:  WBC now normal, lipase was normal, LFT are increased somewhat.  No stones seen on any of the imaging studies.    IMP:  Picture may be more c/w severe biliary colic, possibly with passage of a CBD stone, as opposed to acute (acalculous) cholecystitis.  She is         significantly improved overall and no urgent surgery needed at this time.  Will continue IV hydration and axbx, start small amts of clears po, and         observe.          Will repeat the LFT's in am tomorrow and if improved, she can likely be sent home on a fat-free diet and be followed in the office.  An elective          interval cholecystectomy may be appropriate.  She understands and agrees and all questions were answered.

## 2020-01-12 NOTE — PROGRESS NOTE ADULT - ASSESSMENT
58 y/o female with Biliary colic / R/O Cholecystitis. 58 y/o female with Biliary colic / R/O Cholelithiasis / Cholecystitis.

## 2020-01-12 NOTE — PROGRESS NOTE ADULT - PROBLEM SELECTOR PLAN 1
.  - Ivabx with Unasyn 1.5 gram q 6 hours.  - .  - Ivabx with Unasyn 1.5 gram q 6 hours.  - As per Dr. Mills,  wants radiologist to comment on MRCP read if any Gallbladder stones present & wants repeat labs with LFT's in am.    - No surgery needed today so will start Clears as tolerated.    - Encourage OOB to chair and ambulation.    - Case & plans d/w Surgery resident who rounded with Dr. Mills this morning.

## 2020-01-13 VITALS
HEART RATE: 63 BPM | DIASTOLIC BLOOD PRESSURE: 63 MMHG | RESPIRATION RATE: 16 BRPM | TEMPERATURE: 96 F | SYSTOLIC BLOOD PRESSURE: 112 MMHG

## 2020-01-13 LAB
ALBUMIN SERPL ELPH-MCNC: 3.7 G/DL — SIGNIFICANT CHANGE UP (ref 3.5–5.2)
ALP SERPL-CCNC: 70 U/L — SIGNIFICANT CHANGE UP (ref 30–115)
ALT FLD-CCNC: 109 U/L — HIGH (ref 0–41)
ANION GAP SERPL CALC-SCNC: 12 MMOL/L — SIGNIFICANT CHANGE UP (ref 7–14)
AST SERPL-CCNC: 62 U/L — HIGH (ref 0–41)
BILIRUB DIRECT SERPL-MCNC: 0.2 MG/DL — SIGNIFICANT CHANGE UP (ref 0–0.2)
BILIRUB INDIRECT FLD-MCNC: 1 MG/DL — SIGNIFICANT CHANGE UP (ref 0.2–1.2)
BILIRUB SERPL-MCNC: 1.2 MG/DL — SIGNIFICANT CHANGE UP (ref 0.2–1.2)
BUN SERPL-MCNC: 9 MG/DL — LOW (ref 10–20)
CALCIUM SERPL-MCNC: 8.6 MG/DL — SIGNIFICANT CHANGE UP (ref 8.5–10.1)
CHLORIDE SERPL-SCNC: 106 MMOL/L — SIGNIFICANT CHANGE UP (ref 98–110)
CO2 SERPL-SCNC: 25 MMOL/L — SIGNIFICANT CHANGE UP (ref 17–32)
CREAT SERPL-MCNC: 0.7 MG/DL — SIGNIFICANT CHANGE UP (ref 0.7–1.5)
GLUCOSE SERPL-MCNC: 92 MG/DL — SIGNIFICANT CHANGE UP (ref 70–99)
HCT VFR BLD CALC: 38.7 % — SIGNIFICANT CHANGE UP (ref 37–47)
HGB BLD-MCNC: 12.2 G/DL — SIGNIFICANT CHANGE UP (ref 12–16)
MCHC RBC-ENTMCNC: 27.9 PG — SIGNIFICANT CHANGE UP (ref 27–31)
MCHC RBC-ENTMCNC: 31.5 G/DL — LOW (ref 32–37)
MCV RBC AUTO: 88.6 FL — SIGNIFICANT CHANGE UP (ref 81–99)
NRBC # BLD: 0 /100 WBCS — SIGNIFICANT CHANGE UP (ref 0–0)
PLATELET # BLD AUTO: 263 K/UL — SIGNIFICANT CHANGE UP (ref 130–400)
POTASSIUM SERPL-MCNC: 4.4 MMOL/L — SIGNIFICANT CHANGE UP (ref 3.5–5)
POTASSIUM SERPL-SCNC: 4.4 MMOL/L — SIGNIFICANT CHANGE UP (ref 3.5–5)
PROT SERPL-MCNC: 6 G/DL — SIGNIFICANT CHANGE UP (ref 6–8)
RBC # BLD: 4.37 M/UL — SIGNIFICANT CHANGE UP (ref 4.2–5.4)
RBC # FLD: 13.1 % — SIGNIFICANT CHANGE UP (ref 11.5–14.5)
SODIUM SERPL-SCNC: 143 MMOL/L — SIGNIFICANT CHANGE UP (ref 135–146)
WBC # BLD: 5.58 K/UL — SIGNIFICANT CHANGE UP (ref 4.8–10.8)
WBC # FLD AUTO: 5.58 K/UL — SIGNIFICANT CHANGE UP (ref 4.8–10.8)

## 2020-01-13 PROCEDURE — 99238 HOSP IP/OBS DSCHRG MGMT 30/<: CPT

## 2020-01-13 RX ORDER — PANTOPRAZOLE SODIUM 20 MG/1
1 TABLET, DELAYED RELEASE ORAL
Qty: 30 | Refills: 0
Start: 2020-01-13

## 2020-01-13 RX ADMIN — AMPICILLIN SODIUM AND SULBACTAM SODIUM 100 GRAM(S): 250; 125 INJECTION, POWDER, FOR SUSPENSION INTRAMUSCULAR; INTRAVENOUS at 00:18

## 2020-01-13 RX ADMIN — Medication 88 MICROGRAM(S): at 05:13

## 2020-01-13 RX ADMIN — AMPICILLIN SODIUM AND SULBACTAM SODIUM 100 GRAM(S): 250; 125 INJECTION, POWDER, FOR SUSPENSION INTRAMUSCULAR; INTRAVENOUS at 05:14

## 2020-01-13 RX ADMIN — AMPICILLIN SODIUM AND SULBACTAM SODIUM 100 GRAM(S): 250; 125 INJECTION, POWDER, FOR SUSPENSION INTRAMUSCULAR; INTRAVENOUS at 11:19

## 2020-01-13 RX ADMIN — PANTOPRAZOLE SODIUM 40 MILLIGRAM(S): 20 TABLET, DELAYED RELEASE ORAL at 05:13

## 2020-01-13 RX ADMIN — Medication 600 MILLIGRAM(S): at 10:49

## 2020-01-13 RX ADMIN — Medication 600 MILLIGRAM(S): at 11:15

## 2020-01-13 NOTE — CHART NOTE - NSCHARTNOTEFT_GEN_A_CORE
Patient tolerated low fat diet; LFT's improving today    Case D/W Dr. Montana earlier as well as with Dr. Mills: will D/C home today on augmentin x 5 days; protonix 40 mg daily; F/U with Gi and F/U with Dr. Mills in 1 week

## 2020-01-13 NOTE — DISCHARGE NOTE PROVIDER - NSDCFUADDINST_GEN_ALL_CORE_FT
Please follow up with a gastroenterologist for possible endoscopy    If you experience increased abdominal pain, yellowing of skin/eyes, nausea or vomiting - return to ER

## 2020-01-13 NOTE — DISCHARGE NOTE PROVIDER - HOSPITAL COURSE
History of Present Illness:     59 year old female with PMH of hypothyroidism, PUD, HLD presents c/o epigastric and RUQ pain since 1 am. Pt states pain woke her up from sleep. She describes it as stabbing, non radiating, 10/10, associated with nausea, retching, and abdominal distention. Pt denies similar episodes in the past. Pt having flatus. Last BM yesterday night.     Pt states her last EGD and colonoscopy were 2 years ago, she was dx with PUD likely 2/2 H.pylori  at that time.          Picture may be more c/w severe biliary colic, possibly with passage of a CBD stone, as opposed to acute (acalculous) cholecystitis.  She is         significantly improved overall and no urgent surgery needed at this time.  Will continue IV hydration and axbx, start small amts of clears po, and         observe.

## 2020-01-13 NOTE — PROGRESS NOTE ADULT - SUBJECTIVE AND OBJECTIVE BOX
S: No significant change in patient - still reports mild RUQ pain; tolerating clear liquids  O; Vital Signs Last 24 Hrs  T(C): 36.3 (13 Jan 2020 05:12), Max: 36.3 (13 Jan 2020 05:12)  T(F): 97.3 (13 Jan 2020 05:12), Max: 97.3 (13 Jan 2020 05:12)  HR: 61 (13 Jan 2020 05:12) (59 - 63)  BP: 134/60 (13 Jan 2020 05:12) (95/63 - 134/60)  BP(mean): --  RR: 16 (13 Jan 2020 05:12) (16 - 18)  SpO2: --    EXAM:  lungs: cta  cvs: s1s2  abd: soft , mild RUQ tenderness, no murphys sign    Labs:  CAPILLARY BLOOD GLUCOSE                              12.2   5.58  )-----------( 263      ( 13 Jan 2020 06:47 )             38.7         01-13    143  |  106  |  9<L>  ----------------------------<  92  4.4   |  25  |  0.7      Calcium, Total Serum: 8.6 mg/dL (01-13-20 @ 06:47)      LFTs:             6.0  | 1.2  | 62       ------------------[70      ( 13 Jan 2020 06:47 )  3.7  | 0.2  | 109           RADIOLOGY:    MR Abdomen No Cont (01.11.20 @ 18:47) >  IMPRESSION:    Normal CBD caliber, with no choledocholithiasis. S: No significant change in patient - still reports mild RUQ pain; tolerating clear liquids  O; Vital Signs Last 24 Hrs  T(C): 36.3 (13 Jan 2020 05:12), Max: 36.3 (13 Jan 2020 05:12)  T(F): 97.3 (13 Jan 2020 05:12), Max: 97.3 (13 Jan 2020 05:12)  HR: 61 (13 Jan 2020 05:12) (59 - 63)  BP: 134/60 (13 Jan 2020 05:12) (95/63 - 134/60)  RR: 16 (13 Jan 2020 05:12) (16 - 18)      EXAM:  lungs: cta  cvs: s1s2  abd: soft , mild RUQ tenderness, no murphys sign    Labs:  CAPILLARY BLOOD GLUCOSE                              12.2   5.58  )-----------( 263      ( 13 Jan 2020 06:47 )             38.7         01-13    143  |  106  |  9<L>  ----------------------------<  92  4.4   |  25  |  0.7      Calcium, Total Serum: 8.6 mg/dL (01-13-20 @ 06:47)      LFTs:             6.0  | 1.2  | 62       ------------------[70      ( 13 Jan 2020 06:47 )  3.7  | 0.2  | 109           RADIOLOGY:    MR Abdomen No Cont (01.11.20 @ 18:47) >  IMPRESSION:    Normal CBD caliber, with no choledocholithiasis.

## 2020-01-13 NOTE — DISCHARGE NOTE PROVIDER - NSDCMRMEDTOKEN_GEN_ALL_CORE_FT
amoxicillin-clavulanate 875 mg-125 mg oral tablet: 1 tab(s) orally every 12 hours   levothyroxine 75 mcg (0.075 mg) oral tablet: 1 tab(s) orally once a day  levothyroxine 88 mcg (0.088 mg) oral tablet: 1 tab(s) orally once a day  pantoprazole 40 mg oral delayed release tablet: 1 tab(s) orally once a day   simvastatin 40 mg oral tablet: 1 tab(s) orally once a day (at bedtime)

## 2020-01-13 NOTE — PROGRESS NOTE ADULT - PROBLEM SELECTOR PLAN 1
. Pt seen & exxamined with Dr. Montana:   - no acute surgical intervention   - will start low fat diet; if tolerates, will D/C home on augmentin and F/U with Dr. Mills for further recommendations   - will need GI F/U to R/O PUD (pt has hx ulcers on prior EGD); will D/C home on protonix  - Continue Unasyn for now  - As per Dr. Mills,  wants radiologist to comment on MRCP read if any Gallbladder stones present & wants repeat labs with LFT's in am.    - No surgery needed today so will start Clears as tolerated.    - Encourage OOB to chair and ambulation.    - Case & plans d/w Surgery resident who rounded with Dr. Mills this morning.

## 2020-01-13 NOTE — PROGRESS NOTE ADULT - ATTENDING COMMENTS
Patient seen and examined with surgery team on rounds and discussed management plans with patient. Patient comfortable tolerating liquids, afebile. No significant tenderness in RUQ. Diet advanced Patient explainedDc home today with outpatient GI and surgery followup. H/o ulcer disease 3 years ago with endoscopyand traetment Patient seen and examined with surgery team on rounds and discussed management plans with patient. Patient comfortable tolerating liquids, afebrile. No significant tenderness in RUQ. Diet advanced Patient explained. Dc home today with outpatient GI and surgery followup. H/o ulcer disease 3 years ago with endoscopy and treatment

## 2020-01-13 NOTE — DISCHARGE NOTE NURSING/CASE MANAGEMENT/SOCIAL WORK - PATIENT PORTAL LINK FT
You can access the FollowMyHealth Patient Portal offered by [facility] by registering at the following website: [email or web address]. By joining [employer]’s FollowMyHealth portal, you will also be able to view your health information using other applications (apps) compatible with our system.

## 2020-01-13 NOTE — DISCHARGE NOTE PROVIDER - CARE PROVIDER_API CALL
Yifan Mills)  Surgery  [address] [place] [address], [address] [place] [address]  Phone: [phone]  Fax: [phone]  Follow Up Time: 1 week

## 2020-01-16 DIAGNOSIS — R10.13 EPIGASTRIC PAIN: ICD-10-CM

## 2020-01-16 DIAGNOSIS — E03.9 HYPOTHYROIDISM, UNSPECIFIED: ICD-10-CM

## 2020-01-16 DIAGNOSIS — E78.5 HYPERLIPIDEMIA, UNSPECIFIED: ICD-10-CM

## 2020-01-16 DIAGNOSIS — K27.9 PEPTIC ULCER, SITE UNSPECIFIED, UNSPECIFIED AS ACUTE OR CHRONIC, WITHOUT HEMORRHAGE OR PERFORATION: ICD-10-CM

## 2020-01-16 DIAGNOSIS — K81.9 CHOLECYSTITIS, UNSPECIFIED: ICD-10-CM

## 2020-01-16 DIAGNOSIS — Z87.891 PERSONAL HISTORY OF NICOTINE DEPENDENCE: ICD-10-CM

## 2020-12-24 PROBLEM — Z00.00 ENCOUNTER FOR PREVENTIVE HEALTH EXAMINATION: Status: ACTIVE | Noted: 2020-12-24

## 2023-12-11 NOTE — ED ADULT TRIAGE NOTE - AS O2 DELIVERY
A1c is 5.3%, TSH is normal, lipid panel is normal stable cmp.  Albumin level is low advised to increase protein intake
Results to be discussed on next visit with ANANTH Bess  
room air

## 2025-06-18 NOTE — PATIENT PROFILE ADULT - NSPRESCRALCSIXMORE_GEN_A_NUR
- Wait until 4-6 weeks post op prior to returning to full activity.  - Call with any questions or concerns.  - Return to clinic with worsening of symptoms.   
Never

## 2025-06-19 ENCOUNTER — EMERGENCY (EMERGENCY)
Facility: HOSPITAL | Age: 65
LOS: 0 days | Discharge: HOME | End: 2025-06-19
Attending: EMERGENCY MEDICINE
Payer: MEDICARE

## 2025-06-19 VITALS
SYSTOLIC BLOOD PRESSURE: 108 MMHG | WEIGHT: 274.92 LBS | HEART RATE: 84 BPM | TEMPERATURE: 98 F | DIASTOLIC BLOOD PRESSURE: 74 MMHG | RESPIRATION RATE: 18 BRPM | OXYGEN SATURATION: 97 %

## 2025-06-19 DIAGNOSIS — Z90.49 ACQUIRED ABSENCE OF OTHER SPECIFIED PARTS OF DIGESTIVE TRACT: Chronic | ICD-10-CM

## 2025-06-19 DIAGNOSIS — E78.5 HYPERLIPIDEMIA, UNSPECIFIED: ICD-10-CM

## 2025-06-19 DIAGNOSIS — M54.2 CERVICALGIA: ICD-10-CM

## 2025-06-19 PROBLEM — K25.9 GASTRIC ULCER, UNSPECIFIED AS ACUTE OR CHRONIC, WITHOUT HEMORRHAGE OR PERFORATION: Chronic | Status: ACTIVE | Noted: 2020-01-11

## 2025-06-19 PROBLEM — E03.9 HYPOTHYROIDISM, UNSPECIFIED: Chronic | Status: ACTIVE | Noted: 2020-01-11

## 2025-06-19 PROCEDURE — 99283 EMERGENCY DEPT VISIT LOW MDM: CPT | Mod: 25

## 2025-06-19 PROCEDURE — 99284 EMERGENCY DEPT VISIT MOD MDM: CPT

## 2025-06-19 PROCEDURE — 96372 THER/PROPH/DIAG INJ SC/IM: CPT

## 2025-06-19 RX ORDER — METHYLPREDNISOLONE ACETATE 80 MG/ML
1 INJECTION, SUSPENSION INTRA-ARTICULAR; INTRALESIONAL; INTRAMUSCULAR; SOFT TISSUE
Qty: 1 | Refills: 0
Start: 2025-06-19 | End: 2025-06-24

## 2025-06-19 RX ORDER — DIAZEPAM 5 MG/1
5 TABLET ORAL ONCE
Refills: 0 | Status: DISCONTINUED | OUTPATIENT
Start: 2025-06-19 | End: 2025-06-19

## 2025-06-19 RX ORDER — METHOCARBAMOL 500 MG/1
2 TABLET, FILM COATED ORAL
Qty: 18 | Refills: 0
Start: 2025-06-19 | End: 2025-06-21

## 2025-06-19 RX ORDER — KETOROLAC TROMETHAMINE 30 MG/ML
30 INJECTION, SOLUTION INTRAMUSCULAR; INTRAVENOUS ONCE
Refills: 0 | Status: DISCONTINUED | OUTPATIENT
Start: 2025-06-19 | End: 2025-06-19

## 2025-06-19 RX ORDER — LIDOCAINE HYDROCHLORIDE 20 MG/ML
1 JELLY TOPICAL
Qty: 1 | Refills: 0
Start: 2025-06-19 | End: 2025-06-23

## 2025-06-19 RX ORDER — LIDOCAINE HYDROCHLORIDE 20 MG/ML
1 JELLY TOPICAL ONCE
Refills: 0 | Status: COMPLETED | OUTPATIENT
Start: 2025-06-19 | End: 2025-06-19

## 2025-06-19 RX ORDER — DEXAMETHASONE 0.5 MG/1
10 TABLET ORAL ONCE
Refills: 0 | Status: COMPLETED | OUTPATIENT
Start: 2025-06-19 | End: 2025-06-19

## 2025-06-19 RX ADMIN — LIDOCAINE HYDROCHLORIDE 1 PATCH: 20 JELLY TOPICAL at 15:27

## 2025-06-19 RX ADMIN — DIAZEPAM 5 MILLIGRAM(S): 5 TABLET ORAL at 15:50

## 2025-06-19 RX ADMIN — DEXAMETHASONE 10 MILLIGRAM(S): 0.5 TABLET ORAL at 15:27

## 2025-06-19 RX ADMIN — KETOROLAC TROMETHAMINE 30 MILLIGRAM(S): 30 INJECTION, SOLUTION INTRAMUSCULAR; INTRAVENOUS at 15:27

## 2025-06-19 NOTE — ED PROVIDER NOTE - OBJECTIVE STATEMENT
64-year-old female with past medical history of hyperlipidemia presenting for evaluation of persistent neck pain.  Patient had MRI done in 3/2025 that showed stenosis/degenerative changes.  Patient has been doing physical therapy without any relief.  Patient is scheduled for an appointment with pain management on Monday but needs some pain relief in the interim.  No paresthesias or weakness.  No fevers or chills.  No headache or dizziness.

## 2025-06-19 NOTE — ED PROVIDER NOTE - ATTENDING APP SHARED VISIT CONTRIBUTION OF CARE
I have personally performed a history and physical exam on this patient and personally directed the management of the patient. Patient is a 64-year-old female presents for evaluation of neck pain onset over the past several months right-sided posterior aspect of the right trapezius patient has a MRI in March showing degenerative changes no acute changes patient states that she a worsening she has a pain management appointment scheduled the next several days but presents here for evaluation denies headache visual changes fevers chills chest pain shortness of breath numbness tingling extremities or weakness patient was able to drive here was able ambulate here    On physical exam patient is normocephalic atraumatic pupils equal round and reactive to light accommodation extraocular muscles intact patient is talking on the phone upon initial evaluation in addition she is able to hold a moderate pocketbook on the left side while holding the phone on the right side she is normocephalic atraumatic pupils equal round reactive light accommodation extraocular muscles intact oropharynx clear chest clear to station bilaterally abdomen soft nontender nondistended bowel sounds positive no guarding roomette extremities full range of motion radial pulse 2+ pedal pulse 2+ patient has reproducible posterior trapezius pain she has adequate range of motion of the neck she is nontoxic afebrile considering time course less likely vascular event in addition normal neuroexam furthermore patient is nontoxic well-appearing afebrile less likely infectious in nature I will discharge to her scheduled appointment we discussed indications for return at this time I will discharge follow-up to PMD/pain management as above

## 2025-06-19 NOTE — ED PROVIDER NOTE - PHYSICAL EXAMINATION
VITAL SIGNS: I have reviewed nursing notes and confirm.  CONSTITUTIONAL: Patient is in no acute distress.  SKIN: Skin exam is warm and dry, no acute rash.  HEAD: Normocephalic; atraumatic.  EYES: PERRL, EOM intact; conjunctiva and sclera clear.  ENT: No nasal discharge; airway clear.   NECK: Supple; Bilateral paraspinal tenderness of the cervical region.  No C-spine tenderness.  CARD: S1, S2 normal; no murmurs, gallops, or rubs. Regular rate and rhythm.  RESP: Clear to auscultation bilaterally. No wheezes, rales or rhonchi.  EXT: Normal ROM. No edema.  LYMPH: No acute cervical adenopathy.  NEURO: Alert, oriented. Grossly unremarkable. No focal deficits.  PSYCH: Cooperative, appropriate.

## 2025-06-19 NOTE — ED PROVIDER NOTE - CLINICAL SUMMARY MEDICAL DECISION MAKING FREE TEXT BOX
. Patient is a 64-year-old female presents for evaluation of neck pain onset over the past several months right-sided posterior aspect of the right trapezius patient has a MRI in March showing degenerative changes no acute changes patient states that she a worsening she has a pain management appointment scheduled the next several days but presents here for evaluation denies headache visual changes fevers chills chest pain shortness of breath numbness tingling extremities or weakness patient was able to drive here was able ambulate here    On physical exam patient is normocephalic atraumatic pupils equal round and reactive to light accommodation extraocular muscles intact patient is talking on the phone upon initial evaluation in addition she is able to hold a moderate pocketbook on the left side while holding the phone on the right side she is normocephalic atraumatic pupils equal round reactive light accommodation extraocular muscles intact oropharynx clear chest clear to station bilaterally abdomen soft nontender nondistended bowel sounds positive no guarding roomette extremities full range of motion radial pulse 2+ pedal pulse 2+ patient has reproducible posterior trapezius pain she has adequate range of motion of the neck she is nontoxic afebrile considering time course less likely vascular event in addition normal neuroexam furthermore patient is nontoxic well-appearing afebrile less likely infectious in nature I will discharge to her scheduled appointment we discussed indications for return at this time I will discharge follow-up to PMD/pain management as above

## 2025-06-19 NOTE — ED PROVIDER NOTE - PROVIDER TOKENS
FREE:[LAST:[Your pain management appointment],PHONE:[(   )    -],FAX:[(   )    -],FOLLOWUP:[1-3 Days]]

## 2025-06-19 NOTE — ED PROVIDER NOTE - NSFOLLOWUPINSTRUCTIONS_ED_ALL_ED_FT
Chronic Neck Pain    WHAT YOU NEED TO KNOW:    Chronic neck pain may start to build slowly over time. Neck pain is chronic if it lasts longer than 3 months. The pain may come and go, or be worse with certain movements. The pain may be only in your neck, or it may move to your arms, back, or shoulders. You may have pain that starts in another body area and moves to your neck. You may have neck pain for years. Some types of neck pain can be permanent.  Vertebral Column    DISCHARGE INSTRUCTIONS:    Call your doctor if:    You have neck pain and shooting pain down your arms or legs.    Your neck pain suddenly becomes severe.    You have neck pain along with numbness, tingling, or weakness in your arms or legs.    You have a stiff neck, a headache, and a fever.    You have new or worsening symptoms.    Your symptoms continue even after treatment.    You have questions or concerns about your condition or care.  Medicines: You may need any of the following:    Acetaminophen decreases pain and fever. It is available without a doctor's order. Ask how much to take and how often to take it. Follow directions. Read the labels of all other medicines you are using to see if they also contain acetaminophen, or ask your doctor or pharmacist. Acetaminophen can cause liver damage if not taken correctly.    NSAIDs, such as ibuprofen, help decrease swelling, pain, and fever. This medicine is available with or without a doctor's order. NSAIDs can cause stomach bleeding or kidney problems in certain people. If you take blood thinner medicine, always ask your healthcare provider if NSAIDs are safe for you. Always read the medicine label and follow directions.    Prescription pain medicine called narcotics or opioids may be given for certain types of chronic pain. Ask your healthcare provider how to take this medicine safely.    Anesthetics can be rubbed on your skin or injected into a nerve or muscle to numb an area.    Other medicines may reduce pain, anxiety, muscle tension, or swelling.    Take your medicine as directed. Contact your healthcare provider if you think your medicine is not helping or if you have side effects. Tell your provider if you are allergic to any medicine. Keep a list of the medicines, vitamins, and herbs you take. Include the amounts, and when and why you take them. Bring the list or the pill bottles to follow-up visits. Carry your medicine list with you in case of an emergency.  Manage or prevent chronic neck pain:    Rest your neck as directed. Do not make sudden movements, such as turning your head quickly. Your healthcare provider may recommend you wear a cervical collar for a short time. The collar will prevent you from moving your head. This will give your neck time to heal if an injury is causing your neck pain. Ask your healthcare provider when you can return to sports or other normal daily activities.    Apply ice for 15 to 20 minutes every hour, or as directed. Use an ice pack, or put crushed ice in a plastic bag. Cover it with a towel before you apply it to your skin. Ice decreases pain and helps prevent tissue damage.    Apply heat for 20 to 30 minutes every 2 hours, or as directed. Heat helps decrease pain and muscle spasms.    Do neck exercises as directed. Neck exercises help strengthen the muscles and increase range of motion. Your healthcare provider will tell you which exercises are right for you. He or she may give you instructions, or he or she may recommend that you work with a physical therapist. Your healthcare provider or therapist can make sure you are doing the exercises correctly.    Maintain good posture. Keep your head and shoulders lifted when you sit. If you work in front of a computer, put the monitor at eye level. You should not need to look up or down to see the screen. You should also not have to lean forward to read what is on the screen. Keep your keyboard, mouse, and other computer items where you do not have to reach for them. Get up often if you work in front of a computer or sit for long periods of time. Stretch or walk around to keep your neck muscles loose.  [employer]      Ask about acupuncture for pain relief. Neck pain is sometimes relieved with acupuncture. Talk to your healthcare provider before you get this treatment to make sure it is safe for you.  Follow up with your healthcare provider as directed: Your healthcare provider may refer you to a specialist if your pain does not get better with treatment. Write down your questions so you remember to ask them during your visits.

## 2025-06-19 NOTE — ED PROVIDER NOTE - PATIENT PORTAL LINK FT
You can access the FollowMyHealth Patient Portal offered by [facility] by registering at the following website: [email or web address]. By joining [employer]’s FollowMyHealth portal, you will also be able to view your health information using other applications (apps) compatible with our system.